# Patient Record
Sex: FEMALE | Race: BLACK OR AFRICAN AMERICAN | NOT HISPANIC OR LATINO | URBAN - METROPOLITAN AREA
[De-identification: names, ages, dates, MRNs, and addresses within clinical notes are randomized per-mention and may not be internally consistent; named-entity substitution may affect disease eponyms.]

---

## 2019-07-17 ENCOUNTER — EMERGENCY (EMERGENCY)
Facility: HOSPITAL | Age: 64
LOS: 1 days | Discharge: HOME | End: 2019-07-17
Attending: EMERGENCY MEDICINE | Admitting: EMERGENCY MEDICINE
Payer: COMMERCIAL

## 2019-07-17 VITALS
OXYGEN SATURATION: 97 % | TEMPERATURE: 99 F | SYSTOLIC BLOOD PRESSURE: 169 MMHG | DIASTOLIC BLOOD PRESSURE: 80 MMHG | HEART RATE: 58 BPM | RESPIRATION RATE: 16 BRPM

## 2019-07-17 VITALS
DIASTOLIC BLOOD PRESSURE: 96 MMHG | RESPIRATION RATE: 15 BRPM | WEIGHT: 156.97 LBS | TEMPERATURE: 98 F | HEART RATE: 78 BPM | OXYGEN SATURATION: 98 % | SYSTOLIC BLOOD PRESSURE: 180 MMHG

## 2019-07-17 PROCEDURE — 99285 EMERGENCY DEPT VISIT HI MDM: CPT | Mod: 25

## 2019-07-17 PROCEDURE — 73562 X-RAY EXAM OF KNEE 3: CPT | Mod: 26,LT

## 2019-07-17 PROCEDURE — 73562 X-RAY EXAM OF KNEE 3: CPT

## 2019-07-17 PROCEDURE — 73610 X-RAY EXAM OF ANKLE: CPT

## 2019-07-17 PROCEDURE — 73590 X-RAY EXAM OF LOWER LEG: CPT

## 2019-07-17 PROCEDURE — 29515 APPLICATION SHORT LEG SPLINT: CPT

## 2019-07-17 PROCEDURE — 29515 APPLICATION SHORT LEG SPLINT: CPT | Mod: LT

## 2019-07-17 PROCEDURE — 73610 X-RAY EXAM OF ANKLE: CPT | Mod: 26,LT

## 2019-07-17 PROCEDURE — 73590 X-RAY EXAM OF LOWER LEG: CPT | Mod: 26,LT

## 2019-07-17 PROCEDURE — 99284 EMERGENCY DEPT VISIT MOD MDM: CPT | Mod: 25

## 2019-07-17 RX ORDER — IBUPROFEN 200 MG
600 TABLET ORAL ONCE
Refills: 0 | Status: COMPLETED | OUTPATIENT
Start: 2019-07-17 | End: 2019-07-17

## 2019-07-17 RX ORDER — LIDOCAINE 4 G/100G
1 CREAM TOPICAL ONCE
Refills: 0 | Status: COMPLETED | OUTPATIENT
Start: 2019-07-17 | End: 2019-07-17

## 2019-07-17 RX ADMIN — Medication 600 MILLIGRAM(S): at 18:14

## 2019-07-17 RX ADMIN — LIDOCAINE 1 PATCH: 4 CREAM TOPICAL at 18:14

## 2019-07-17 NOTE — ED PROVIDER NOTE - ATTENDING CONTRIBUTION TO CARE
pt with c/o left knee and ankle pain after trip and fall at work today.  pain with ambulation.  no deformity.  TTP over left patella and left lateral mall.  xray + left patellar fracture.  splint, crutches, ortho fu

## 2019-07-17 NOTE — ED PROVIDER NOTE - OBJECTIVE STATEMENT
63 y female slipped and fell at work, has left knee, ankle pain,  lower back pain,  no head injury, no loc, no neck, chest , abdominal pain.  no hx of previous injury to left knee, ankle, or leg.   no blood thinners.  no ortho .  PMD Dr Sharma

## 2019-07-17 NOTE — ED ADULT NURSE NOTE - NSIMPLEMENTINTERV_GEN_ALL_ED
Implemented All Fall Risk Interventions:  Roy to call system. Call bell, personal items and telephone within reach. Instruct patient to call for assistance. Room bathroom lighting operational. Non-slip footwear when patient is off stretcher. Physically safe environment: no spills, clutter or unnecessary equipment. Stretcher in lowest position, wheels locked, appropriate side rails in place. Provide visual cue, wrist band, yellow gown, etc. Monitor gait and stability. Monitor for mental status changes and reorient to person, place, and time. Review medications for side effects contributing to fall risk. Reinforce activity limits and safety measures with patient and family.

## 2019-07-17 NOTE — ED PROVIDER NOTE - PROGRESS NOTE DETAILS
patient refused xray of lower back , splint applied to left ankle, knee immobilizer to left knee, given informaiton for Dr Sibley, recommended otc tylenol or motrin for pain

## 2019-07-17 NOTE — ED PROVIDER NOTE - CARE PLAN
Principal Discharge DX:	Acute pain of left knee  Secondary Diagnosis:	Acute left ankle pain  Secondary Diagnosis:	Fall, initial encounter

## 2019-07-17 NOTE — ED ADULT NURSE REASSESSMENT NOTE - NS ED NURSE REASSESS COMMENT FT1
knee immobilizer place don left knee. Patient demonstrated proper technique with crutches. Vikas PATTERSON

## 2021-09-10 NOTE — ED PROVIDER NOTE - VASCULAR COMPROMISE
"""Pain with palliation. (-) discharge.  Can refer for possible NLDO"" right hip OA, Planned right THR no vascular compromise